# Patient Record
Sex: MALE | Race: WHITE | NOT HISPANIC OR LATINO | URBAN - METROPOLITAN AREA
[De-identification: names, ages, dates, MRNs, and addresses within clinical notes are randomized per-mention and may not be internally consistent; named-entity substitution may affect disease eponyms.]

---

## 2018-06-04 ENCOUNTER — OFFICE VISIT (OUTPATIENT)
Dept: URBAN - METROPOLITAN AREA CLINIC 16 | Facility: CLINIC | Age: 78
End: 2018-06-04
Payer: MEDICARE

## 2018-06-04 PROCEDURE — 92012 INTRM OPH EXAM EST PATIENT: CPT | Performed by: OPTOMETRIST

## 2018-06-04 PROCEDURE — 92083 EXTENDED VISUAL FIELD XM: CPT | Performed by: OPTOMETRIST

## 2018-06-04 PROCEDURE — 92133 CPTRZD OPH DX IMG PST SGM ON: CPT | Performed by: OPTOMETRIST

## 2018-06-04 ASSESSMENT — INTRAOCULAR PRESSURE
OD: 13
OS: 15

## 2018-06-04 NOTE — IMPRESSION/PLAN
Impression: Age-related nuclear cataract, bilateral: H25.13. Plan: Discussed diagnosis. Will continue to observe.

## 2018-06-04 NOTE — IMPRESSION/PLAN
Impression: Primary open-angle glaucoma, bilateral, moderate stage: J69.0287. OCT and VF performed Plan: IOP stable, well controlled. Continue using current medication(s).

## 2018-10-01 ENCOUNTER — OFFICE VISIT (OUTPATIENT)
Dept: URBAN - METROPOLITAN AREA CLINIC 16 | Facility: CLINIC | Age: 78
End: 2018-10-01
Payer: MEDICARE

## 2018-10-01 PROCEDURE — 92012 INTRM OPH EXAM EST PATIENT: CPT | Performed by: OPTOMETRIST

## 2018-10-01 ASSESSMENT — INTRAOCULAR PRESSURE
OD: 18
OS: 14

## 2018-10-01 NOTE — IMPRESSION/PLAN
Impression: Primary open-angle glaucoma, bilateral, moderate stage: C43.2561. Plan: IOP slightly > observe closely. Continue using current medication(s).

## 2019-01-14 ENCOUNTER — OFFICE VISIT (OUTPATIENT)
Dept: URBAN - METROPOLITAN AREA CLINIC 16 | Facility: CLINIC | Age: 79
End: 2019-01-14
Payer: MEDICARE

## 2019-01-14 PROCEDURE — 92012 INTRM OPH EXAM EST PATIENT: CPT | Performed by: OPTOMETRIST

## 2019-01-14 ASSESSMENT — INTRAOCULAR PRESSURE
OS: 16
OD: 18

## 2019-01-14 NOTE — IMPRESSION/PLAN
Impression: Primary open-angle glaucoma, bilateral, moderate stage: L80.7558. Plan: Discussed diagnosis in detail with patient. IOP stable, well controlled. Continue using current medication(s).

## 2019-05-13 ENCOUNTER — OFFICE VISIT (OUTPATIENT)
Dept: URBAN - METROPOLITAN AREA CLINIC 16 | Facility: CLINIC | Age: 79
End: 2019-05-13
Payer: MEDICARE

## 2019-05-13 PROCEDURE — 92014 COMPRE OPH EXAM EST PT 1/>: CPT | Performed by: OPTOMETRIST

## 2019-05-13 ASSESSMENT — INTRAOCULAR PRESSURE
OD: 16
OS: 17

## 2019-05-13 NOTE — IMPRESSION/PLAN
Impression: Primary open-angle glaucoma, right eye, moderate stage: H40.1112. OCT performed and reviewed today. Plan: IOP stable, well controlled. Continue using current medication(s).

## 2019-05-13 NOTE — IMPRESSION/PLAN
Impression: Type 2 diabetes mellitus without complications: F74.7. Plan: Diabetes type II: no background retinopathy, no signs of neovascularization or macular edema noted. Discussed ocular and systemic benefits of blood sugar control.

## 2019-05-13 NOTE — IMPRESSION/PLAN
Does patient have record of home blood pressure readings no. Pt states she will go to [facility] when they open to set up htn program. Last dose of blood pressure medication was taken at 6:00am.Patient is asymptomatic. BP: (!) 122/90 , Pulse: 93.Pt had another appointment to go to today so didn't want to wait another 15 minutes to recheck.Dr. Mari notified.  Impression: Age-related nuclear cataract, bilateral: H25.13. Plan: Discussed diagnosis. Will continue to observe.

## 2019-09-16 ENCOUNTER — OFFICE VISIT (OUTPATIENT)
Dept: URBAN - METROPOLITAN AREA CLINIC 16 | Facility: CLINIC | Age: 79
End: 2019-09-16
Payer: MEDICARE

## 2019-09-16 DIAGNOSIS — H25.13 AGE-RELATED NUCLEAR CATARACT, BILATERAL: ICD-10-CM

## 2019-09-16 PROCEDURE — 92012 INTRM OPH EXAM EST PATIENT: CPT | Performed by: OPTOMETRIST

## 2019-09-16 RX ORDER — LATANOPROST 50 UG/ML
0.005 % SOLUTION OPHTHALMIC
Qty: 7.5 | Refills: 2 | Status: INACTIVE
Start: 2019-09-16 | End: 2020-07-22

## 2019-09-16 RX ORDER — TIMOLOL MALEATE 5 MG/ML
0.5 % SOLUTION/ DROPS OPHTHALMIC
Qty: 15 | Refills: 2 | Status: INACTIVE
Start: 2019-09-16 | End: 2020-11-26

## 2019-09-16 ASSESSMENT — INTRAOCULAR PRESSURE
OD: 18
OS: 17

## 2019-09-16 NOTE — IMPRESSION/PLAN
Impression: Primary open-angle glaucoma, right eye, moderate stage: H40.1112. Plan: IOP stable, well controlled. Continue using current medication(s). Medication refill given today.

## 2020-01-27 ENCOUNTER — OFFICE VISIT (OUTPATIENT)
Dept: URBAN - METROPOLITAN AREA CLINIC 16 | Facility: CLINIC | Age: 80
End: 2020-01-27
Payer: MEDICARE

## 2020-01-27 PROCEDURE — 92083 EXTENDED VISUAL FIELD XM: CPT | Performed by: OPTOMETRIST

## 2020-01-27 PROCEDURE — 92012 INTRM OPH EXAM EST PATIENT: CPT | Performed by: OPTOMETRIST

## 2020-01-27 ASSESSMENT — INTRAOCULAR PRESSURE
OD: 12
OS: 12

## 2020-01-27 NOTE — IMPRESSION/PLAN
Impression: Primary open-angle glaucoma, bilateral, moderate stage: I36.3791. VF performed and reviewed today Plan: IOP stable, well controlled. observe closely. Continue using current medication(s).

## 2020-06-01 ENCOUNTER — OFFICE VISIT (OUTPATIENT)
Dept: URBAN - METROPOLITAN AREA CLINIC 16 | Facility: CLINIC | Age: 80
End: 2020-06-01
Payer: MEDICARE

## 2020-06-01 PROCEDURE — 92133 CPTRZD OPH DX IMG PST SGM ON: CPT | Performed by: OPTOMETRIST

## 2020-06-01 PROCEDURE — 92014 COMPRE OPH EXAM EST PT 1/>: CPT | Performed by: OPTOMETRIST

## 2020-06-01 ASSESSMENT — INTRAOCULAR PRESSURE
OD: 12
OS: 13

## 2020-06-01 NOTE — IMPRESSION/PLAN
Impression: Type 2 diabetes mellitus without complications: G76.6. Plan: Diabetes type II: no background retinopathy, no signs of neovascularization or macular edema noted. Discussed ocular and systemic benefits of blood sugar control.

## 2020-06-01 NOTE — IMPRESSION/PLAN
Impression: Primary open-angle glaucoma, bilateral, moderate stage: P86.0527. OCt perofmred and reviewed today. Plan: IOP stable, well controlled. Continue using current medication(s).

## 2020-06-05 ENCOUNTER — OFFICE VISIT (OUTPATIENT)
Dept: URBAN - METROPOLITAN AREA CLINIC 16 | Facility: CLINIC | Age: 80
End: 2020-06-05
Payer: MEDICARE

## 2020-06-05 PROCEDURE — 99204 OFFICE O/P NEW MOD 45 MIN: CPT | Performed by: OPHTHALMOLOGY

## 2020-06-05 PROCEDURE — 99215 OFFICE O/P EST HI 40 MIN: CPT | Performed by: OPHTHALMOLOGY

## 2020-06-05 ASSESSMENT — INTRAOCULAR PRESSURE
OS: 16
OD: 17

## 2020-06-05 ASSESSMENT — KERATOMETRY
OS: 42.38
OD: 42.63

## 2020-06-05 ASSESSMENT — VISUAL ACUITY
OD: 20/50
OS: 20/30

## 2020-06-05 NOTE — IMPRESSION/PLAN
Impression: Age-related nuclear cataract, bilateral: H25.13. Talta Omkar Visually significant, quality of life issue, could improve with surgery. Plan: Cataracts account for the patient's complaints. Discussed all risks, benefits, alternatives, procedures and recovery. Patient understands changing glasses will not improve vision. Patient desires to have surgery, recommend phacoemulsification with intraocular lens implant OD.  lvl 2 - pt considering toric premium IOL -  Re-evaluate OS for possible cataract surgery after OD is done.

## 2020-06-05 NOTE — IMPRESSION/PLAN
Impression: Dry eye syndrome of bilateral lacrimal glands: H04.123. Condition: established, stable. Plan: Dry eyes account for the patient's complaints. There is no evidence of permanent changes to the cornea. Explained condition does not have a cure and will need artificial tears for maintenance.

## 2020-06-05 NOTE — IMPRESSION/PLAN
Impression: Primary open-angle glaucoma, bilateral, moderate stage: Y01.2031. Condition: established, stable. Plan: Intraocular pressure well controlled, tolerating medications. Will continue with same regimen.

## 2020-07-01 ENCOUNTER — PRE-OPERATIVE VISIT (OUTPATIENT)
Dept: URBAN - METROPOLITAN AREA CLINIC 16 | Facility: CLINIC | Age: 80
End: 2020-07-01
Payer: MEDICARE

## 2020-07-01 PROCEDURE — 92136 OPHTHALMIC BIOMETRY: CPT | Performed by: OPHTHALMOLOGY

## 2020-07-01 RX ORDER — OFLOXACIN 3 MG/ML
0.3 % SOLUTION/ DROPS OPHTHALMIC
Qty: 5 | Refills: 1 | Status: INACTIVE
Start: 2020-07-26 | End: 2020-08-03

## 2020-07-01 RX ORDER — KETOROLAC TROMETHAMINE 5 MG/ML
0.5 % SOLUTION OPHTHALMIC
Qty: 10 | Refills: 1 | Status: INACTIVE
Start: 2020-07-28 | End: 2020-08-24

## 2020-07-01 RX ORDER — KETOROLAC TROMETHAMINE 5 MG/ML
0.5 % SOLUTION OPHTHALMIC
Qty: 10 | Refills: 1 | Status: INACTIVE
Start: 2020-07-01 | End: 2020-08-10

## 2020-07-01 RX ORDER — PREDNISOLONE ACETATE 10 MG/ML
1 % SUSPENSION/ DROPS OPHTHALMIC
Qty: 10 | Refills: 1 | Status: INACTIVE
Start: 2020-07-28 | End: 2020-08-24

## 2020-07-01 RX ORDER — OFLOXACIN 3 MG/ML
0.3 % SOLUTION/ DROPS OPHTHALMIC
Qty: 5 | Refills: 1 | Status: INACTIVE
Start: 2020-07-01 | End: 2020-07-20

## 2020-07-01 RX ORDER — PREDNISOLONE ACETATE 10 MG/ML
1 % SUSPENSION/ DROPS OPHTHALMIC
Qty: 10 | Refills: 1 | Status: INACTIVE
Start: 2020-07-01 | End: 2020-08-10

## 2020-07-01 ASSESSMENT — PACHYMETRY
OD: 3.31
OS: 3.28
OD: 24.49
OS: 24.51

## 2020-07-13 ENCOUNTER — SURGERY (OUTPATIENT)
Dept: URBAN - METROPOLITAN AREA SURGERY 11 | Facility: SURGERY | Age: 80
End: 2020-07-13
Payer: MEDICARE

## 2020-07-13 PROCEDURE — 66984 XCAPSL CTRC RMVL W/O ECP: CPT | Performed by: OPHTHALMOLOGY

## 2020-07-14 ENCOUNTER — POST-OPERATIVE VISIT (OUTPATIENT)
Dept: URBAN - METROPOLITAN AREA CLINIC 16 | Facility: CLINIC | Age: 80
End: 2020-07-14

## 2020-07-14 DIAGNOSIS — Z09 ENCNTR FOR F/U EXAM AFT TRTMT FOR COND OTH THAN MALIG NEOPLM: Primary | ICD-10-CM

## 2020-07-14 DIAGNOSIS — Z48.810 ENCNTR FOR SURGICAL AFTCR FOL SURGERY ON THE SENSE ORGANS: ICD-10-CM

## 2020-07-14 PROCEDURE — 99024 POSTOP FOLLOW-UP VISIT: CPT | Performed by: OPTOMETRIST

## 2020-07-14 ASSESSMENT — INTRAOCULAR PRESSURE
OD: 30
OS: 16

## 2020-07-16 ENCOUNTER — POST-OPERATIVE VISIT (OUTPATIENT)
Dept: URBAN - METROPOLITAN AREA CLINIC 16 | Facility: CLINIC | Age: 80
End: 2020-07-16

## 2020-07-16 PROCEDURE — 99024 POSTOP FOLLOW-UP VISIT: CPT | Performed by: OPTOMETRIST

## 2020-07-16 ASSESSMENT — INTRAOCULAR PRESSURE
OD: 18
OS: 17

## 2020-07-24 ENCOUNTER — OFFICE VISIT (OUTPATIENT)
Dept: URBAN - METROPOLITAN AREA CLINIC 16 | Facility: CLINIC | Age: 80
End: 2020-07-24
Payer: MEDICARE

## 2020-07-24 DIAGNOSIS — H25.12 AGE-RELATED NUCLEAR CATARACT, LEFT EYE: Primary | ICD-10-CM

## 2020-07-24 ASSESSMENT — INTRAOCULAR PRESSURE
OS: 13
OD: 13

## 2020-07-24 NOTE — IMPRESSION/PLAN
Impression: Age-related nuclear cataract, left eye: H25.12. Bonnie Kelly Visually significant, quality of life issue, could improve with surgery. Plan: Cataracts account for the patient's complaints. Discussed all risks, benefits, alternatives, procedures and recovery. Patient understands changing glasses will not improve vision. Patient desires to have surgery, recommend phacoemulsification with intraocular lens implant OS.   lvl 2 - pt wants toric IOL -

## 2020-08-31 ENCOUNTER — SURGERY (OUTPATIENT)
Dept: URBAN - METROPOLITAN AREA SURGERY 11 | Facility: SURGERY | Age: 80
End: 2020-08-31
Payer: MEDICARE

## 2020-08-31 PROCEDURE — 66984 XCAPSL CTRC RMVL W/O ECP: CPT | Performed by: OPHTHALMOLOGY

## 2020-09-01 ENCOUNTER — POST-OPERATIVE VISIT (OUTPATIENT)
Dept: URBAN - METROPOLITAN AREA CLINIC 16 | Facility: CLINIC | Age: 80
End: 2020-09-01
Payer: MEDICARE

## 2020-09-01 ASSESSMENT — INTRAOCULAR PRESSURE
OD: 12
OS: 13

## 2020-09-01 NOTE — IMPRESSION/PLAN
Impression: S/P CE/Toric IOL- SN6AT3 [1.5 D]-Aspheric +20.0 OS - 1 Day. Presence of intraocular lens  Z96.1.  Post operative instructions reviewed - Plan: Continue oculfoxacin, Start PRED/Ketor and directed

## 2020-09-08 ENCOUNTER — POST-OPERATIVE VISIT (OUTPATIENT)
Dept: URBAN - METROPOLITAN AREA CLINIC 16 | Facility: CLINIC | Age: 80
End: 2020-09-08
Payer: MEDICARE

## 2020-09-08 ASSESSMENT — INTRAOCULAR PRESSURE
OS: 14
OD: 14

## 2020-09-08 NOTE — IMPRESSION/PLAN
Impression: S/P CE/Toric IOL- SN6AT3 [1.5 D]-Aspheric +20.0 OS - 8 Days. Presence of intraocular lens  Z96.1.  Post operative instructions reviewed - Plan: D/C Ocufloxacin, Taper Pred/Ketor as directed

## 2020-09-30 ENCOUNTER — POST-OPERATIVE VISIT (OUTPATIENT)
Dept: URBAN - METROPOLITAN AREA CLINIC 16 | Facility: CLINIC | Age: 80
End: 2020-09-30
Payer: MEDICARE

## 2020-09-30 ASSESSMENT — VISUAL ACUITY
OD: 20/30-
OS: 20/30-

## 2020-09-30 ASSESSMENT — INTRAOCULAR PRESSURE
OS: 11
OD: 11

## 2020-12-30 ENCOUNTER — OFFICE VISIT (OUTPATIENT)
Dept: URBAN - METROPOLITAN AREA CLINIC 16 | Facility: CLINIC | Age: 80
End: 2020-12-30
Payer: MEDICARE

## 2020-12-30 DIAGNOSIS — Z96.1 PRESENCE OF PSEUDOPHAKIA: ICD-10-CM

## 2020-12-30 PROCEDURE — 92012 INTRM OPH EXAM EST PATIENT: CPT | Performed by: OPTOMETRIST

## 2020-12-30 ASSESSMENT — INTRAOCULAR PRESSURE
OD: 14
OS: 14

## 2020-12-30 NOTE — IMPRESSION/PLAN
Impression: Primary open-angle glaucoma, bilateral, moderate stage: H44.1816. Plan: IOP stable, well controlled. Continue using current medication(s).

## 2021-04-12 ENCOUNTER — OFFICE VISIT (OUTPATIENT)
Dept: URBAN - METROPOLITAN AREA CLINIC 16 | Facility: CLINIC | Age: 81
End: 2021-04-12
Payer: MEDICARE

## 2021-04-12 DIAGNOSIS — H40.1132 PRIMARY OPEN-ANGLE GLAUCOMA, BILATERAL, MODERATE STAGE: Primary | ICD-10-CM

## 2021-04-12 PROCEDURE — 92083 EXTENDED VISUAL FIELD XM: CPT | Performed by: OPTOMETRIST

## 2021-04-12 PROCEDURE — 99213 OFFICE O/P EST LOW 20 MIN: CPT | Performed by: OPTOMETRIST

## 2021-04-12 ASSESSMENT — INTRAOCULAR PRESSURE
OD: 14
OS: 14

## 2021-04-12 NOTE — IMPRESSION/PLAN
Impression: Primary open-angle glaucoma, bilateral, moderate stage: C38.0925. VF performed and reviewed today. Plan: IOP stable, well controlled. Continue using current medication(s).

## 2021-08-23 ENCOUNTER — OFFICE VISIT (OUTPATIENT)
Dept: URBAN - METROPOLITAN AREA CLINIC 16 | Facility: CLINIC | Age: 81
End: 2021-08-23
Payer: MEDICARE

## 2021-08-23 DIAGNOSIS — H40.1131 PRIMARY OPEN-ANGLE GLAUCOMA, BILATERAL, MILD STAGE: Primary | ICD-10-CM

## 2021-08-23 DIAGNOSIS — E11.9 TYPE 2 DIABETES MELLITUS WITHOUT COMPLICATIONS: ICD-10-CM

## 2021-08-23 PROCEDURE — 92133 CPTRZD OPH DX IMG PST SGM ON: CPT | Performed by: OPTOMETRIST

## 2021-08-23 PROCEDURE — 99213 OFFICE O/P EST LOW 20 MIN: CPT | Performed by: OPTOMETRIST

## 2021-08-23 NOTE — IMPRESSION/PLAN
Impression: Primary open-angle glaucoma, bilateral, mild stage: H40.1131. OCT performed and reviewed today. Stable as compared to last. Plan: IOP stable, well controlled. Continue using current medication(s).

## 2021-08-23 NOTE — IMPRESSION/PLAN
Impression: Type 2 diabetes mellitus without complications: P10.3. Plan: Diabetes type II: no background retinopathy, no signs of neovascularization or macular edema noted. Discussed ocular and systemic benefits of blood sugar control.

## 2021-12-06 ENCOUNTER — OFFICE VISIT (OUTPATIENT)
Dept: URBAN - METROPOLITAN AREA CLINIC 16 | Facility: CLINIC | Age: 81
End: 2021-12-06
Payer: MEDICARE

## 2021-12-06 DIAGNOSIS — H40.1121 PRIMARY OPEN-ANGLE GLAUCOMA, LEFT EYE, MILD STAGE: ICD-10-CM

## 2021-12-06 DIAGNOSIS — H04.123 DRY EYE SYNDROME OF BILATERAL LACRIMAL GLANDS: ICD-10-CM

## 2021-12-06 DIAGNOSIS — H40.1112 PRIMARY OPEN-ANGLE GLAUCOMA, RIGHT EYE, MODERATE STAGE: Primary | ICD-10-CM

## 2021-12-06 PROCEDURE — 99213 OFFICE O/P EST LOW 20 MIN: CPT | Performed by: OPTOMETRIST

## 2021-12-06 ASSESSMENT — INTRAOCULAR PRESSURE
OD: 14
OS: 13

## 2021-12-06 NOTE — IMPRESSION/PLAN
Impression: Primary open-angle glaucoma, right eye, moderate stage: H40.1112. Plan: IOP stable, well controlled. Continue using current medication(s).

## 2022-04-04 ENCOUNTER — OFFICE VISIT (OUTPATIENT)
Dept: URBAN - METROPOLITAN AREA CLINIC 16 | Facility: CLINIC | Age: 82
End: 2022-04-04
Payer: MEDICARE

## 2022-04-04 PROCEDURE — 99213 OFFICE O/P EST LOW 20 MIN: CPT | Performed by: OPTOMETRIST

## 2022-04-04 PROCEDURE — 92083 EXTENDED VISUAL FIELD XM: CPT | Performed by: OPTOMETRIST

## 2022-04-04 ASSESSMENT — INTRAOCULAR PRESSURE
OD: 8
OS: 12

## 2022-04-04 NOTE — IMPRESSION/PLAN
Impression: Primary open-angle glaucoma, right eye, moderate stage: H40.1112. Plan: Discussed condition w/pt. HVF performed and reviewed. IOp stable, 8/12 today. Continue Latanoprost QHS OU and Timolo BID OU. RTC 3-4 months x CFM, OCT RNFL.

## 2022-08-04 ENCOUNTER — OFFICE VISIT (OUTPATIENT)
Dept: URBAN - METROPOLITAN AREA CLINIC 16 | Facility: CLINIC | Age: 82
End: 2022-08-04
Payer: MEDICARE

## 2022-08-04 DIAGNOSIS — E11.9 TYPE 2 DIABETES MELLITUS WITHOUT COMPLICATIONS: Primary | ICD-10-CM

## 2022-08-04 DIAGNOSIS — H40.1132 PRIMARY OPEN-ANGLE GLAUCOMA, BILATERAL, MODERATE STAGE: ICD-10-CM

## 2022-08-04 DIAGNOSIS — Z96.1 PRESENCE OF PSEUDOPHAKIA: ICD-10-CM

## 2022-08-04 PROCEDURE — 92014 COMPRE OPH EXAM EST PT 1/>: CPT | Performed by: OPTOMETRIST

## 2022-08-04 ASSESSMENT — INTRAOCULAR PRESSURE
OD: 12
OS: 12

## 2022-08-04 ASSESSMENT — KERATOMETRY
OD: 42.38
OS: 42.38

## 2022-08-04 NOTE — IMPRESSION/PLAN
Impression: Type 2 diabetes mellitus without complications: Z20.6. Plan: No Non-Proliferative Diabetic Retinopathy, no Diabetic Macular Edema and no Neovascularization of the iris, disc, or elsewhere. OPTOS photos performed and reviewed today, no sign of diabetic macular edema OU. Discussed ocular and systemic benefits of blood sugar control. Send notes to PCP. Check annually. RTC 1 year x CEE.

## 2022-08-04 NOTE — IMPRESSION/PLAN
Impression: Primary open-angle glaucoma, bilateral, moderate stage: E29.3513. Plan: Discussed condition w/pt. IOP stable today. Continue Latanoprost QHS and Timolol BID OU. RTC 3-4 months x Iop check, HVF, OCT.

## 2023-08-16 ENCOUNTER — OFFICE VISIT (OUTPATIENT)
Dept: URBAN - METROPOLITAN AREA CLINIC 16 | Facility: CLINIC | Age: 83
End: 2023-08-16
Payer: MEDICARE

## 2023-08-16 DIAGNOSIS — H40.1132 PRIMARY OPEN-ANGLE GLAUCOMA, BILATERAL, MODERATE STAGE: Primary | ICD-10-CM

## 2023-08-16 PROCEDURE — 99213 OFFICE O/P EST LOW 20 MIN: CPT | Performed by: OPTOMETRIST

## 2023-08-16 ASSESSMENT — INTRAOCULAR PRESSURE
OD: 12
OS: 12

## 2024-01-16 ENCOUNTER — OFFICE VISIT (OUTPATIENT)
Dept: URBAN - METROPOLITAN AREA CLINIC 16 | Facility: CLINIC | Age: 84
End: 2024-01-16
Payer: MEDICARE

## 2024-01-16 DIAGNOSIS — H40.1132 PRIMARY OPEN-ANGLE GLAUCOMA, BILATERAL, MODERATE STAGE: Primary | ICD-10-CM

## 2024-01-16 PROCEDURE — 99213 OFFICE O/P EST LOW 20 MIN: CPT | Performed by: OPTOMETRIST

## 2024-01-16 ASSESSMENT — INTRAOCULAR PRESSURE
OS: 12
OD: 12

## 2024-07-17 ENCOUNTER — OFFICE VISIT (OUTPATIENT)
Dept: URBAN - METROPOLITAN AREA CLINIC 16 | Facility: CLINIC | Age: 84
End: 2024-07-17
Payer: MEDICARE

## 2024-07-17 DIAGNOSIS — H40.1132 PRIMARY OPEN-ANGLE GLAUCOMA, BILATERAL, MODERATE STAGE: ICD-10-CM

## 2024-07-17 DIAGNOSIS — Z96.1 PRESENCE OF PSEUDOPHAKIA: ICD-10-CM

## 2024-07-17 DIAGNOSIS — H04.123 DRY EYE SYNDROME OF BILATERAL LACRIMAL GLANDS: ICD-10-CM

## 2024-07-17 DIAGNOSIS — E11.9 TYPE 2 DIABETES MELLITUS WITHOUT COMPLICATIONS: Primary | ICD-10-CM

## 2024-07-17 PROCEDURE — 92014 COMPRE OPH EXAM EST PT 1/>: CPT | Performed by: OPTOMETRIST

## 2024-07-17 ASSESSMENT — KERATOMETRY
OD: 42.75
OS: 42.25

## 2024-07-17 ASSESSMENT — INTRAOCULAR PRESSURE
OS: 13
OD: 13